# Patient Record
(demographics unavailable — no encounter records)

---

## 2021-09-19 NOTE — EMERGENCY DEPARTMENT REPORT
ED Peds Fever HPI





- General


Stated Complaint: ALLERGIC REACTION


Time Seen by Provider: 09/19/21 17:26


Source: patient





- History of Present Illness


Initial Comments: 





Patient is 1 year and 1-month-old female with no significant past medical 

history.  Patient brought to the emergency room by her mother for evaluation of 

fever for the last few days followed by generalized rash.  Mother stated that 

her p.o. intake is the same with no change.  Patient is not irritable.  Patient 

is playing in in her mother labs with no difficulties.  Patient does not look 

toxic.


MD Complaint: fever, other (rash)


-: days(s)


Hydration Status: drinking fluids, normal amount of wet diapers, normal tearing


Activity Level at Home: normal


Associated Symptoms: rash


Treatments Prior to Arrival: Acetaminophen





- Related Data


Immunizations UTD: yes





ED Review of Systems


ROS: 


Stated complaint: ALLERGIC REACTION


Other details as noted in HPI





Comment: All other systems reviewed and negative


Constitutional: denies: chills, fever


Respiratory: denies: cough, shortness of breath, SOB with exertion, SOB at rest


Cardiovascular: denies: chest pain, palpitations


Gastrointestinal: denies: abdominal pain, nausea, vomiting, diarrhea


Skin: rash.  denies: lesions





ED Physical Exam





- General


General appearance: alert, in no apparent distress





- Head


Head exam: Present: atraumatic, normocephalic, normal inspection





- Eye


Eye exam: Present: normal appearance, PERRL





- ENT


ENT exam: Present: normal exam, normal orophraynx, mucous membranes moist





- Neck


Neck exam: Present: normal inspection, full ROM.  Absent: tenderness, 

meningismus





- Respiratory


Respiratory exam: Present: normal lung sounds bilaterally





- Cardiovascular


Cardiovascular Exam: Present: regular rate, normal rhythm, normal heart sounds





- GI/Abdominal


GI/Abdominal exam: Present: soft, normal bowel sounds.  Absent: distended, 

tenderness, guarding, rebound, rigid, organomegaly, mass, bruit, pulsatile mass,

hernia





- Extremities Exam


Extremities exam: Present: full ROM, normal capillary refill.  Absent: 

tenderness, pedal edema, joint swelling, calf tenderness





- Neurological Exam


Neurological exam: Present: alert





- Skin


Skin exam: Present: rash





ED Medical Decision Making





- Medical Decision Making





Patient is 1 year and 1-month-old female with no significant past medical 

history.  Patient brought to the emergency room by her mother for evaluation of 

fever for the last few days followed by generalized rash.  Mother stated that 

her p.o. intake is the same with no change.  Patient is not irritable.  Patient 

is playing in in her mother labs with no difficulties.  Patient does not look 

toxic.





Patient does have diffuse maculopapular rash.  This is most likely viral 

exanthema.  Patient mother advised to alternate Tylenol and Motrin for fever and

to follow-up with her primary care physician in the next 2 to 3 days and to 

return to the ER if she develop any symptoms.


Critical care attestation.: 


If time is entered above; I have spent that time in minutes in the direct care 

of this critically ill patient, excluding procedure time.








ED Disposition


Clinical Impression: 


 Viral syndrome, Viral exanthemata





Disposition: 01 HOME / SELF CARE / HOMELESS


Is pt being admited?: No


Condition: Stable


Instructions:  Viral Illness, Pediatric, Rash, Pediatric, Easy-to-Read


Referrals: 


PRIMARY CARE,MD [Referring] - 3-5 Days